# Patient Record
Sex: MALE | Race: OTHER
[De-identification: names, ages, dates, MRNs, and addresses within clinical notes are randomized per-mention and may not be internally consistent; named-entity substitution may affect disease eponyms.]

---

## 2022-05-15 ENCOUNTER — HOSPITAL ENCOUNTER (EMERGENCY)
Dept: HOSPITAL 54 - ER | Age: 42
Discharge: HOME | End: 2022-05-15
Payer: MEDICARE

## 2022-05-15 VITALS — BODY MASS INDEX: 36.1 KG/M2 | HEIGHT: 67 IN | WEIGHT: 230 LBS

## 2022-05-15 VITALS — DIASTOLIC BLOOD PRESSURE: 89 MMHG | SYSTOLIC BLOOD PRESSURE: 160 MMHG

## 2022-05-15 DIAGNOSIS — I10: ICD-10-CM

## 2022-05-15 DIAGNOSIS — Z20.822: ICD-10-CM

## 2022-05-15 DIAGNOSIS — Z79.899: ICD-10-CM

## 2022-05-15 DIAGNOSIS — F29: Primary | ICD-10-CM

## 2022-05-15 LAB
ALBUMIN SERPL BCP-MCNC: 2.9 G/DL (ref 3.4–5)
ALP SERPL-CCNC: 151 U/L (ref 46–116)
ALT SERPL W P-5'-P-CCNC: 22 U/L (ref 12–78)
APAP SERPL-MCNC: < 0 UG/ML (ref 10–30)
AST SERPL W P-5'-P-CCNC: 15 U/L (ref 15–37)
BASOPHILS # BLD AUTO: 0.1 K/UL (ref 0–0.2)
BASOPHILS NFR BLD AUTO: 0.8 % (ref 0–2)
BILIRUB DIRECT SERPL-MCNC: 0.1 MG/DL (ref 0–0.2)
BILIRUB SERPL-MCNC: 0.2 MG/DL (ref 0.2–1)
BILIRUB UR QL STRIP: NEGATIVE
BUN SERPL-MCNC: 21 MG/DL (ref 7–18)
CALCIUM SERPL-MCNC: 8.3 MG/DL (ref 8.5–10.1)
CHLORIDE SERPL-SCNC: 94 MMOL/L (ref 98–107)
CO2 SERPL-SCNC: 22 MMOL/L (ref 21–32)
COLOR UR: YELLOW
CREAT SERPL-MCNC: 1.6 MG/DL (ref 0.6–1.3)
EOSINOPHIL NFR BLD AUTO: 2.2 % (ref 0–6)
ETHANOL SERPL-MCNC: < 3 MG/DL (ref 0–0)
GLUCOSE SERPL-MCNC: 291 MG/DL (ref 74–106)
GLUCOSE UR STRIP-MCNC: (no result) MG/DL
HCT VFR BLD AUTO: 26 % (ref 39–51)
HGB BLD-MCNC: 8.7 G/DL (ref 13.5–17.5)
LEUKOCYTE ESTERASE UR QL STRIP: NEGATIVE
LYMPHOCYTES NFR BLD AUTO: 1.4 K/UL (ref 0.8–4.8)
LYMPHOCYTES NFR BLD AUTO: 19.9 % (ref 20–44)
MCHC RBC AUTO-ENTMCNC: 34 G/DL (ref 31–36)
MCV RBC AUTO: 85 FL (ref 80–96)
MONOCYTES NFR BLD AUTO: 0.6 K/UL (ref 0.1–1.3)
MONOCYTES NFR BLD AUTO: 8.6 % (ref 2–12)
NEUTROPHILS # BLD AUTO: 4.9 K/UL (ref 1.8–8.9)
NEUTROPHILS NFR BLD AUTO: 68.5 % (ref 43–81)
NITRITE UR QL STRIP: NEGATIVE
PH UR STRIP: 6 [PH] (ref 5–8)
PLATELET # BLD AUTO: 336 K/UL (ref 150–450)
POTASSIUM SERPL-SCNC: 4.4 MMOL/L (ref 3.5–5.1)
PROT SERPL-MCNC: 7 G/DL (ref 6.4–8.2)
PROT UR QL STRIP: 30 MG/DL
RBC # BLD AUTO: 3.02 MIL/UL (ref 4.5–6)
RBC #/AREA URNS HPF: (no result) /HPF (ref 0–2)
SODIUM SERPL-SCNC: 124 MMOL/L (ref 136–145)
UROBILINOGEN UR STRIP-MCNC: 0.2 EU/DL
WBC #/AREA URNS HPF: (no result) /HPF (ref 0–3)
WBC NRBC COR # BLD AUTO: 7.1 K/UL (ref 4.3–11)

## 2022-05-15 PROCEDURE — 99285 EMERGENCY DEPT VISIT HI MDM: CPT

## 2022-05-15 PROCEDURE — 80143 DRUG ASSAY ACETAMINOPHEN: CPT

## 2022-05-15 PROCEDURE — 96372 THER/PROPH/DIAG INJ SC/IM: CPT

## 2022-05-15 PROCEDURE — C9803 HOPD COVID-19 SPEC COLLECT: HCPCS

## 2022-05-15 PROCEDURE — 80048 BASIC METABOLIC PNL TOTAL CA: CPT

## 2022-05-15 PROCEDURE — 80307 DRUG TEST PRSMV CHEM ANLYZR: CPT

## 2022-05-15 PROCEDURE — 80320 DRUG SCREEN QUANTALCOHOLS: CPT

## 2022-05-15 PROCEDURE — 85025 COMPLETE CBC W/AUTO DIFF WBC: CPT

## 2022-05-15 PROCEDURE — 81001 URINALYSIS AUTO W/SCOPE: CPT

## 2022-05-15 PROCEDURE — 87426 SARSCOV CORONAVIRUS AG IA: CPT

## 2022-05-15 PROCEDURE — 80076 HEPATIC FUNCTION PANEL: CPT

## 2022-05-15 PROCEDURE — G0480 DRUG TEST DEF 1-7 CLASSES: HCPCS

## 2022-05-15 PROCEDURE — 36415 COLL VENOUS BLD VENIPUNCTURE: CPT

## 2023-01-24 ENCOUNTER — HOSPITAL ENCOUNTER (INPATIENT)
Dept: HOSPITAL 12 - REHABOV3 | Age: 43
LOS: 13 days | Discharge: HOME HEALTH | DRG: 871 | End: 2023-02-06
Attending: PHYSICAL MEDICINE & REHABILITATION | Admitting: PHYSICAL MEDICINE & REHABILITATION
Payer: MEDICARE

## 2023-01-24 VITALS — HEIGHT: 69 IN | BODY MASS INDEX: 35.99 KG/M2 | WEIGHT: 243 LBS

## 2023-01-24 VITALS — DIASTOLIC BLOOD PRESSURE: 92 MMHG | SYSTOLIC BLOOD PRESSURE: 157 MMHG

## 2023-01-24 DIAGNOSIS — K76.0: ICD-10-CM

## 2023-01-24 DIAGNOSIS — N49.3: ICD-10-CM

## 2023-01-24 DIAGNOSIS — E87.6: ICD-10-CM

## 2023-01-24 DIAGNOSIS — N40.1: ICD-10-CM

## 2023-01-24 DIAGNOSIS — N13.8: ICD-10-CM

## 2023-01-24 DIAGNOSIS — E43: ICD-10-CM

## 2023-01-24 DIAGNOSIS — A41.9: Primary | ICD-10-CM

## 2023-01-24 DIAGNOSIS — L02.214: ICD-10-CM

## 2023-01-24 DIAGNOSIS — F20.9: ICD-10-CM

## 2023-01-24 DIAGNOSIS — N17.0: ICD-10-CM

## 2023-01-24 DIAGNOSIS — I10: ICD-10-CM

## 2023-01-24 DIAGNOSIS — E11.10: ICD-10-CM

## 2023-01-24 DIAGNOSIS — N17.9: ICD-10-CM

## 2023-01-24 DIAGNOSIS — I46.9: ICD-10-CM

## 2023-01-24 DIAGNOSIS — E66.01: ICD-10-CM

## 2023-01-24 DIAGNOSIS — N13.6: ICD-10-CM

## 2023-01-24 DIAGNOSIS — Z91.14: ICD-10-CM

## 2023-01-24 DIAGNOSIS — B48.8: ICD-10-CM

## 2023-01-24 DIAGNOSIS — L02.215: ICD-10-CM

## 2023-01-24 DIAGNOSIS — D64.9: ICD-10-CM

## 2023-01-24 DIAGNOSIS — Z79.4: ICD-10-CM

## 2023-01-24 DIAGNOSIS — D68.59: ICD-10-CM

## 2023-01-24 DIAGNOSIS — N32.3: ICD-10-CM

## 2023-01-24 DIAGNOSIS — E11.51: ICD-10-CM

## 2023-01-24 DIAGNOSIS — R53.1: ICD-10-CM

## 2023-01-24 PROCEDURE — A6209 FOAM DRSG <=16 SQ IN W/O BDR: HCPCS

## 2023-01-24 PROCEDURE — A6213 FOAM DRG >16<=48 SQ IN W/BDR: HCPCS

## 2023-01-24 NOTE — NUR
Arrived in the floor from [facility] via [facility]. AAOx3, verbally responsive, calm, in no apparent 
distress. [place] 3 people assist. F/C intact and patent with 
moderate amount of yellow colored urine. Dressing on right ischial wound soiled and curled. 
Redness noted on perineum area. Able to move all extremities and able to repositioned self 
in bed. Routine admission care done. Safety measures and fall precaution initiated. Plan of 
care initiated. VS taken and recorded.

## 2023-01-25 VITALS — DIASTOLIC BLOOD PRESSURE: 97 MMHG | SYSTOLIC BLOOD PRESSURE: 160 MMHG

## 2023-01-25 VITALS — DIASTOLIC BLOOD PRESSURE: 69 MMHG | SYSTOLIC BLOOD PRESSURE: 126 MMHG

## 2023-01-25 VITALS — SYSTOLIC BLOOD PRESSURE: 143 MMHG | DIASTOLIC BLOOD PRESSURE: 63 MMHG

## 2023-01-25 VITALS — SYSTOLIC BLOOD PRESSURE: 142 MMHG | DIASTOLIC BLOOD PRESSURE: 82 MMHG

## 2023-01-25 RX ADMIN — Medication SCH MG: at 20:37

## 2023-01-25 RX ADMIN — CLOZAPINE SCH MG: 100 TABLET ORAL at 20:33

## 2023-01-25 RX ADMIN — PIPERACILLIN SODIUM AND TAZOBACTAM SODIUM SCH MLS/HR: .375; 3 INJECTION, POWDER, LYOPHILIZED, FOR SOLUTION INTRAVENOUS at 22:00

## 2023-01-25 RX ADMIN — PIPERACILLIN SODIUM AND TAZOBACTAM SODIUM SCH MLS/HR: .375; 3 INJECTION, POWDER, LYOPHILIZED, FOR SOLUTION INTRAVENOUS at 19:49

## 2023-01-25 RX ADMIN — INSULIN GLARGINE SCH UNITS: 100 INJECTION, SOLUTION SUBCUTANEOUS at 20:44

## 2023-01-25 RX ADMIN — CLOTRIMAZOLE AND BETAMETHASONE DIPROPIONATE SCH GM: 10; .5 CREAM TOPICAL at 20:33

## 2023-01-25 RX ADMIN — HYDROCODONE BITARTRATE AND ACETAMINOPHEN PRN TAB: 5; 325 TABLET ORAL at 19:59

## 2023-01-25 RX ADMIN — ANORECTAL OINTMENT SCH GM: 15.7; .44; 24; 20.6 OINTMENT TOPICAL at 20:34

## 2023-01-25 RX ADMIN — ANORECTAL OINTMENT SCH GM: 15.7; .44; 24; 20.6 OINTMENT TOPICAL at 09:17

## 2023-01-25 NOTE — NUR
RECEIVED REPORT FROM FLORIDA CASTRO RN. PATIENT IS ALERT & ORIENTED X4 AND SPEAKS ENGLISH. 
VITAL SIGNS STABLE. PATIENT TOLERATES PO MEDICATIONS AND DIET WELL. PATIENT PARTICIPATES 
WITH PHYSICAL AND OCCUPATIONAL THERAPY AS SCHEDULE. PATIENT VOIDS ADEQUATELY & HAD 3 BOWEL 
MOVEMENTS. PATIENT HAD COMPLAINTS OF PAIN, BUT DID NOT WANT PAIN MEDICATIONS AT THE TIME. 
FITZ PATEL, COMPLETED MEDICATION RECONCILIATION. NO ACUTE DISTRESS NOTED. FALL PRECAUTIONS 
OBSERVED; INCLUDING BUT NOT LIMITED TO BED IN LOWEST POSITION AND BED ALARM ON. ALL NEEDS 
MET AT THIS TIME. ENDORSED CARE TO VALENTÍN CASTRO, FOR CONTINUATION OF CARE.

## 2023-01-25 NOTE — NUR
BS checked 223 mg/dl. Denies any s/s of hyperglycemia, stating that he drunk cranberry juice 
an hour ago and refused any insulin to be taken at this time. Will monitor.

## 2023-01-26 VITALS — DIASTOLIC BLOOD PRESSURE: 84 MMHG | SYSTOLIC BLOOD PRESSURE: 145 MMHG

## 2023-01-26 VITALS — SYSTOLIC BLOOD PRESSURE: 122 MMHG | DIASTOLIC BLOOD PRESSURE: 66 MMHG

## 2023-01-26 VITALS — DIASTOLIC BLOOD PRESSURE: 83 MMHG | SYSTOLIC BLOOD PRESSURE: 158 MMHG

## 2023-01-26 VITALS — DIASTOLIC BLOOD PRESSURE: 65 MMHG | SYSTOLIC BLOOD PRESSURE: 113 MMHG

## 2023-01-26 RX ADMIN — PIPERACILLIN SODIUM AND TAZOBACTAM SODIUM SCH MLS/HR: .375; 3 INJECTION, POWDER, LYOPHILIZED, FOR SOLUTION INTRAVENOUS at 13:48

## 2023-01-26 RX ADMIN — CLOTRIMAZOLE AND BETAMETHASONE DIPROPIONATE SCH GM: 10; .5 CREAM TOPICAL at 17:45

## 2023-01-26 RX ADMIN — EMPAGLIFLOZIN SCH MG: 25 TABLET, FILM COATED ORAL at 09:42

## 2023-01-26 RX ADMIN — PANTOPRAZOLE SODIUM SCH MG: 40 TABLET, DELAYED RELEASE ORAL at 17:45

## 2023-01-26 RX ADMIN — FERROUS SULFATE TAB 325 MG (65 MG ELEMENTAL FE) SCH MG: 325 (65 FE) TAB at 09:43

## 2023-01-26 RX ADMIN — PIPERACILLIN SODIUM AND TAZOBACTAM SODIUM SCH MLS/HR: .375; 3 INJECTION, POWDER, LYOPHILIZED, FOR SOLUTION INTRAVENOUS at 05:19

## 2023-01-26 RX ADMIN — SODIUM HYPOCHLORITE SCH ML: 1.25 SOLUTION TOPICAL at 09:45

## 2023-01-26 RX ADMIN — CLOZAPINE SCH MG: 100 TABLET ORAL at 09:42

## 2023-01-26 RX ADMIN — SODIUM CHLORIDE PRN UNIT: 9 INJECTION, SOLUTION INTRAVENOUS at 17:33

## 2023-01-26 RX ADMIN — SODIUM CHLORIDE PRN UNIT: 9 INJECTION, SOLUTION INTRAVENOUS at 12:36

## 2023-01-26 RX ADMIN — Medication SCH EACH: at 20:21

## 2023-01-26 RX ADMIN — INSULIN GLARGINE SCH UNITS: 100 INJECTION, SOLUTION SUBCUTANEOUS at 20:23

## 2023-01-26 RX ADMIN — HYDROCHLOROTHIAZIDE SCH MG: 25 TABLET ORAL at 09:44

## 2023-01-26 RX ADMIN — PANTOPRAZOLE SODIUM SCH MG: 40 TABLET, DELAYED RELEASE ORAL at 06:03

## 2023-01-26 RX ADMIN — VALSARTAN SCH MG: 160 TABLET ORAL at 09:43

## 2023-01-26 RX ADMIN — ANORECTAL OINTMENT SCH GM: 15.7; .44; 24; 20.6 OINTMENT TOPICAL at 20:18

## 2023-01-26 RX ADMIN — CLOTRIMAZOLE AND BETAMETHASONE DIPROPIONATE SCH GM: 10; .5 CREAM TOPICAL at 09:43

## 2023-01-26 RX ADMIN — INSULIN GLARGINE SCH UNITS: 100 INJECTION, SOLUTION SUBCUTANEOUS at 09:54

## 2023-01-26 RX ADMIN — Medication SCH ML: at 09:45

## 2023-01-26 RX ADMIN — SODIUM CHLORIDE PRN UNIT: 9 INJECTION, SOLUTION INTRAVENOUS at 20:25

## 2023-01-26 RX ADMIN — FOLIC ACID SCH MG: 1 TABLET ORAL at 09:44

## 2023-01-26 RX ADMIN — CLOZAPINE SCH MG: 100 TABLET ORAL at 20:17

## 2023-01-26 RX ADMIN — ANORECTAL OINTMENT SCH GM: 15.7; .44; 24; 20.6 OINTMENT TOPICAL at 09:45

## 2023-01-26 RX ADMIN — Medication SCH ML: at 17:45

## 2023-01-26 RX ADMIN — Medication SCH EACH: at 16:33

## 2023-01-26 RX ADMIN — Medication SCH MG: at 17:46

## 2023-01-26 RX ADMIN — PIPERACILLIN SODIUM AND TAZOBACTAM SODIUM SCH MLS/HR: .375; 3 INJECTION, POWDER, LYOPHILIZED, FOR SOLUTION INTRAVENOUS at 21:22

## 2023-01-26 RX ADMIN — Medication SCH MG: at 09:00

## 2023-01-26 NOTE — NUR
WOUND CARE: PT SEEN FOR  PERINEAL/RT BUTTOCK SURGICAL WOUND WHICH IS NOTED TO HAVE SURGICAL 
DEVICE IN PLACE. RED GRANULATION TISSUE NOTED WITH MODERATE AMOUNT OF SEROSANGUINOUS 
DRAINAGE, NO ODOR. RECOMMEND SURGICAL FOLLOW UP. DISCUSSED SKIN PROTECTION AND WOUND CARE 
RECOMMENDATIONS WITH NURSING STAFF. MD IN AGREEMENT WITH PLAN OF CARE.

## 2023-01-26 NOTE — NUR
RECEIVED REPORT FROM FLORIDA CASTRO RN. PATIENT IS ALERT & ORIENTED X4 AND SPEAKS ENGLISH. 
VITAL SIGNS STABLE. PATIENT TOLERATES PO & IV MEDICATIONS AND DIET WELL. ANTIBIOTICS GIVEN 
AS ORDERED. PATIENT PARTICIPATES WITH PHYSICAL AND OCCUPATIONAL THERAPY AS SCHEDULE. JAMES 
CATHETER PATENT AND DRAINING. PATIENT VOIDS ADEQUATELY & HAD 3 BOWEL MOVEMENTS. PATIENT HAD 
COMPLAINTS OF PAIN, BUT DID NOT WANT PAIN MEDICATIONS AT THE TIME. INSULIN SLIDING SCALE AND 
DIABETES MANAGEMENT RECONCILED BY PHARMACY, PER REQUEST OF FITZ PATEL. NO ACUTE DISTRESS 
NOTED. FALL PRECAUTIONS OBSERVED; INCLUDING BUT NOT LIMITED TO BED IN LOWEST POSITION AND 
BED ALARM ON. ALL NEEDS MET AT THIS TIME. ENDORSED CARE TO VALENTÍN CASTRO, FOR CONTINUATION OF 
CARE.

## 2023-01-26 NOTE — NUR
WOUND CARE CONSULT: ATTEMPTED TO SEE PT THIS AM AT 0750 BUT PT WAS SLEEPING SOUNDLY. 
REVIEWED CHART, NURSING DOCUMENTATION AND PHOTO WHICH INDICATES PERINEAL/BUTTOCK SURGICAL 
WOUND, PRESENT ON ADMISSION. MSG LEFT FOR DR ALLAN BRIONES REGARDING WOUND TREATMENT AND 
SURGICAL FOLLOW UP. DISCUSSED SKIN PROTECTION WITH NURSING STAFF.

## 2023-01-27 VITALS — SYSTOLIC BLOOD PRESSURE: 140 MMHG | DIASTOLIC BLOOD PRESSURE: 75 MMHG

## 2023-01-27 VITALS — DIASTOLIC BLOOD PRESSURE: 72 MMHG | SYSTOLIC BLOOD PRESSURE: 125 MMHG

## 2023-01-27 VITALS — DIASTOLIC BLOOD PRESSURE: 73 MMHG | SYSTOLIC BLOOD PRESSURE: 130 MMHG

## 2023-01-27 VITALS — SYSTOLIC BLOOD PRESSURE: 153 MMHG | DIASTOLIC BLOOD PRESSURE: 85 MMHG

## 2023-01-27 RX ADMIN — HYDROCODONE BITARTRATE AND ACETAMINOPHEN PRN TAB: 5; 325 TABLET ORAL at 11:51

## 2023-01-27 RX ADMIN — PANTOPRAZOLE SODIUM SCH MG: 40 TABLET, DELAYED RELEASE ORAL at 05:24

## 2023-01-27 RX ADMIN — PIPERACILLIN SODIUM AND TAZOBACTAM SODIUM SCH MLS/HR: .375; 3 INJECTION, POWDER, LYOPHILIZED, FOR SOLUTION INTRAVENOUS at 05:19

## 2023-01-27 RX ADMIN — SODIUM CHLORIDE PRN UNIT: 9 INJECTION, SOLUTION INTRAVENOUS at 17:31

## 2023-01-27 RX ADMIN — INSULIN GLARGINE SCH UNITS: 100 INJECTION, SOLUTION SUBCUTANEOUS at 09:19

## 2023-01-27 RX ADMIN — LORAZEPAM PRN MG: 1 TABLET ORAL at 17:31

## 2023-01-27 RX ADMIN — Medication SCH ML: at 09:18

## 2023-01-27 RX ADMIN — FOLIC ACID SCH MG: 1 TABLET ORAL at 09:14

## 2023-01-27 RX ADMIN — VALSARTAN SCH MG: 160 TABLET ORAL at 09:31

## 2023-01-27 RX ADMIN — FERROUS SULFATE TAB 325 MG (65 MG ELEMENTAL FE) SCH MG: 325 (65 FE) TAB at 09:15

## 2023-01-27 RX ADMIN — HYDROCODONE BITARTRATE AND ACETAMINOPHEN PRN TAB: 5; 325 TABLET ORAL at 17:33

## 2023-01-27 RX ADMIN — SODIUM HYPOCHLORITE SCH ML: 1.25 SOLUTION TOPICAL at 09:20

## 2023-01-27 RX ADMIN — PANTOPRAZOLE SODIUM SCH MG: 40 TABLET, DELAYED RELEASE ORAL at 17:22

## 2023-01-27 RX ADMIN — EMPAGLIFLOZIN SCH MG: 25 TABLET, FILM COATED ORAL at 09:16

## 2023-01-27 RX ADMIN — CLOZAPINE SCH MG: 100 TABLET ORAL at 20:44

## 2023-01-27 RX ADMIN — Medication SCH MG: at 17:22

## 2023-01-27 RX ADMIN — Medication SCH ML: at 17:22

## 2023-01-27 RX ADMIN — ANORECTAL OINTMENT SCH GM: 15.7; .44; 24; 20.6 OINTMENT TOPICAL at 20:47

## 2023-01-27 RX ADMIN — PIPERACILLIN SODIUM AND TAZOBACTAM SODIUM SCH MLS/HR: .375; 3 INJECTION, POWDER, LYOPHILIZED, FOR SOLUTION INTRAVENOUS at 14:00

## 2023-01-27 RX ADMIN — Medication SCH EACH: at 20:44

## 2023-01-27 RX ADMIN — INSULIN GLARGINE SCH UNITS: 100 INJECTION, SOLUTION SUBCUTANEOUS at 20:45

## 2023-01-27 RX ADMIN — PIPERACILLIN SODIUM AND TAZOBACTAM SODIUM SCH MLS/HR: .375; 3 INJECTION, POWDER, LYOPHILIZED, FOR SOLUTION INTRAVENOUS at 22:12

## 2023-01-27 RX ADMIN — Medication SCH EACH: at 11:48

## 2023-01-27 RX ADMIN — SODIUM CHLORIDE PRN UNIT: 9 INJECTION, SOLUTION INTRAVENOUS at 09:22

## 2023-01-27 RX ADMIN — Medication SCH MG: at 09:15

## 2023-01-27 RX ADMIN — CLOTRIMAZOLE AND BETAMETHASONE DIPROPIONATE SCH GM: 10; .5 CREAM TOPICAL at 17:23

## 2023-01-27 RX ADMIN — ANORECTAL OINTMENT SCH GM: 15.7; .44; 24; 20.6 OINTMENT TOPICAL at 09:17

## 2023-01-27 RX ADMIN — Medication SCH EACH: at 06:06

## 2023-01-27 RX ADMIN — SODIUM CHLORIDE PRN UNIT: 9 INJECTION, SOLUTION INTRAVENOUS at 20:56

## 2023-01-27 RX ADMIN — CLOZAPINE SCH MG: 100 TABLET ORAL at 09:16

## 2023-01-27 RX ADMIN — SODIUM CHLORIDE PRN UNIT: 9 INJECTION, SOLUTION INTRAVENOUS at 11:49

## 2023-01-27 RX ADMIN — CLOTRIMAZOLE AND BETAMETHASONE DIPROPIONATE SCH GM: 10; .5 CREAM TOPICAL at 09:21

## 2023-01-27 RX ADMIN — HYDROCHLOROTHIAZIDE SCH MG: 25 TABLET ORAL at 09:15

## 2023-01-27 RX ADMIN — Medication SCH EACH: at 17:20

## 2023-01-27 NOTE — NUR
1900-PATIENT IN BED, AWAKE, A/Ox4, VERBALIZES NEEDS AND FOLLOWS DIRECTIONS. NO RESPIRATORY 
DISTRESS NOTED. The patient has patent jason midline that is clean, patent, dry, and intact. 
The patient has a schuler catheter that is clean, patent, dry, intact, and below the bladder. 
The patient has a wound in his groin area. Wound is not draining, and is clean, and dry. 
Changed dressing of wound. SAFETY MEASURES AND CALL LIGHT AT REACH, bed at lowest position, 
wheels lock, and two side rails up, bed alarm on. ORAL FLUIDS OFFERED AS IVELISSE. AND TAKEN 
WELL. Will continue to monitor throughout the shift. 



2200- The patient is awake and watching television. The patient has no signs of distress, or 
sob. Will continue to monitor throughout the shift. 



0300- The patient request for an extra pillow. Item is given to the patient. The patient has 
no sob. Will continue to monitor throughout the shift.

## 2023-01-28 VITALS — SYSTOLIC BLOOD PRESSURE: 135 MMHG | DIASTOLIC BLOOD PRESSURE: 83 MMHG

## 2023-01-28 VITALS — SYSTOLIC BLOOD PRESSURE: 141 MMHG | DIASTOLIC BLOOD PRESSURE: 88 MMHG

## 2023-01-28 VITALS — DIASTOLIC BLOOD PRESSURE: 78 MMHG | SYSTOLIC BLOOD PRESSURE: 141 MMHG

## 2023-01-28 RX ADMIN — PIPERACILLIN SODIUM AND TAZOBACTAM SODIUM SCH MLS/HR: .375; 3 INJECTION, POWDER, LYOPHILIZED, FOR SOLUTION INTRAVENOUS at 13:10

## 2023-01-28 RX ADMIN — PIPERACILLIN SODIUM AND TAZOBACTAM SODIUM SCH MLS/HR: .375; 3 INJECTION, POWDER, LYOPHILIZED, FOR SOLUTION INTRAVENOUS at 22:10

## 2023-01-28 RX ADMIN — CLOTRIMAZOLE AND BETAMETHASONE DIPROPIONATE SCH GM: 10; .5 CREAM TOPICAL at 09:11

## 2023-01-28 RX ADMIN — FERROUS SULFATE TAB 325 MG (65 MG ELEMENTAL FE) SCH MG: 325 (65 FE) TAB at 09:09

## 2023-01-28 RX ADMIN — CLOZAPINE SCH MG: 100 TABLET ORAL at 09:15

## 2023-01-28 RX ADMIN — Medication SCH ML: at 09:10

## 2023-01-28 RX ADMIN — ANORECTAL OINTMENT SCH GM: 15.7; .44; 24; 20.6 OINTMENT TOPICAL at 09:10

## 2023-01-28 RX ADMIN — PANTOPRAZOLE SODIUM SCH MG: 40 TABLET, DELAYED RELEASE ORAL at 06:49

## 2023-01-28 RX ADMIN — CLOTRIMAZOLE AND BETAMETHASONE DIPROPIONATE SCH GM: 10; .5 CREAM TOPICAL at 16:20

## 2023-01-28 RX ADMIN — Medication SCH MG: at 16:19

## 2023-01-28 RX ADMIN — EMPAGLIFLOZIN SCH MG: 25 TABLET, FILM COATED ORAL at 09:15

## 2023-01-28 RX ADMIN — VALSARTAN SCH MG: 160 TABLET ORAL at 09:10

## 2023-01-28 RX ADMIN — CLOZAPINE SCH MG: 100 TABLET ORAL at 21:21

## 2023-01-28 RX ADMIN — Medication SCH EACH: at 16:19

## 2023-01-28 RX ADMIN — SODIUM CHLORIDE PRN UNIT: 9 INJECTION, SOLUTION INTRAVENOUS at 16:31

## 2023-01-28 RX ADMIN — SODIUM HYPOCHLORITE SCH ML: 1.25 SOLUTION TOPICAL at 09:11

## 2023-01-28 RX ADMIN — Medication SCH EACH: at 07:37

## 2023-01-28 RX ADMIN — Medication SCH EACH: at 21:22

## 2023-01-28 RX ADMIN — Medication SCH EACH: at 11:04

## 2023-01-28 RX ADMIN — PANTOPRAZOLE SODIUM SCH MG: 40 TABLET, DELAYED RELEASE ORAL at 16:18

## 2023-01-28 RX ADMIN — INSULIN GLARGINE SCH UNITS: 100 INJECTION, SOLUTION SUBCUTANEOUS at 09:15

## 2023-01-28 RX ADMIN — FOLIC ACID SCH MG: 1 TABLET ORAL at 09:09

## 2023-01-28 RX ADMIN — Medication SCH MG: at 09:09

## 2023-01-28 RX ADMIN — HYDROCHLOROTHIAZIDE SCH MG: 25 TABLET ORAL at 09:09

## 2023-01-28 RX ADMIN — PIPERACILLIN SODIUM AND TAZOBACTAM SODIUM SCH MLS/HR: .375; 3 INJECTION, POWDER, LYOPHILIZED, FOR SOLUTION INTRAVENOUS at 06:49

## 2023-01-28 RX ADMIN — SODIUM CHLORIDE PRN UNIT: 9 INJECTION, SOLUTION INTRAVENOUS at 11:09

## 2023-01-28 RX ADMIN — INSULIN GLARGINE SCH UNITS: 100 INJECTION, SOLUTION SUBCUTANEOUS at 21:22

## 2023-01-28 RX ADMIN — SODIUM CHLORIDE PRN UNIT: 9 INJECTION, SOLUTION INTRAVENOUS at 21:23

## 2023-01-28 RX ADMIN — Medication SCH ML: at 16:19

## 2023-01-28 RX ADMIN — ANORECTAL OINTMENT SCH GM: 15.7; .44; 24; 20.6 OINTMENT TOPICAL at 21:26

## 2023-01-28 NOTE — NUR
1910-PATIENT IN BED, AWAKE, A/Ox4, VERBALIZES NEEDS AND FOLLOWS DIRECTIONS. NO RESPIRATORY 
DISTRESS NOTED. The patient has patent jason midline that is clean, patent, dry, and intact. 
The patient has a schuler catheter that is clean, patent, dry, intact, and below the bladder. 
The patient has a wound in his groin area. Wound is not draining, and is clean, and dry. 
Changed dressing of wound. SAFETY MEASURES AND CALL LIGHT AT REACH, bed at lowest position, 
wheels lock, and two side rails up, bed alarm on. ORAL FLUIDS OFFERED AS IVELISSE. AND TAKEN 
WELL. Will continue to monitor throughout the shift. 



2300- The patient is awake and request for extra water.  item is given to the patient. The 
patient has no signs of distress, or sob. Will continue to monitor throughout the shift. 



0200- The patient request for an extra pillow. Item is given to the patient. The patient has 
no sob. Will continue to monitor throughout the shift.

## 2023-01-28 NOTE — NUR
dressing changed to right groin wound, no drainage noted, wound bed is red in color, 
drainage tube is intact. no acute distress noted, no events noted.

## 2023-01-29 VITALS — DIASTOLIC BLOOD PRESSURE: 70 MMHG | SYSTOLIC BLOOD PRESSURE: 118 MMHG

## 2023-01-29 VITALS — SYSTOLIC BLOOD PRESSURE: 132 MMHG | DIASTOLIC BLOOD PRESSURE: 89 MMHG

## 2023-01-29 VITALS — DIASTOLIC BLOOD PRESSURE: 66 MMHG | SYSTOLIC BLOOD PRESSURE: 117 MMHG

## 2023-01-29 LAB
ALP SERPL-CCNC: 181 U/L (ref 50–136)
ALT SERPL W/O P-5'-P-CCNC: 20 U/L (ref 16–63)
APPEARANCE UR: (no result)
AST SERPL-CCNC: 12 U/L (ref 15–37)
BILIRUB SERPL-MCNC: 0.2 MG/DL (ref 0.2–1)
BILIRUB UR QL STRIP: NEGATIVE
BUN SERPL-MCNC: 14 MG/DL (ref 7–18)
CHLORIDE SERPL-SCNC: 103 MMOL/L (ref 98–107)
CO2 SERPL-SCNC: 30 MMOL/L (ref 21–32)
COLOR UR: YELLOW
CREAT SERPL-MCNC: 1.9 MG/DL (ref 0.6–1.3)
CREAT UR-MCNC: 42.4 MG/DL (ref 30–125)
DEPRECATED SQUAMOUS URNS QL MICRO: (no result) /HPF
GLUCOSE SERPL-MCNC: 141 MG/DL (ref 74–106)
GLUCOSE UR STRIP-MCNC: (no result) MG/DL
HCT VFR BLD AUTO: 26.1 % (ref 36.7–47.1)
HGB UR QL STRIP: (no result)
KETONES UR STRIP-MCNC: NEGATIVE MG/DL
LEUKOCYTE ESTERASE UR QL STRIP: (no result)
MAGNESIUM SERPL-MCNC: 2.3 MG/DL (ref 1.8–2.4)
MCH RBC QN AUTO: 29.7 UUG (ref 23.8–33.4)
MCV RBC AUTO: 89.8 FL (ref 73–96.2)
NITRITE UR QL STRIP: NEGATIVE
PH UR STRIP: 6 [PH] (ref 5–8)
PHOSPHATE SERPL-MCNC: 4.5 MG/DL (ref 2.5–4.9)
PLATELET # BLD AUTO: 377 K/UL (ref 152–348)
POTASSIUM SERPL-SCNC: 3.3 MMOL/L (ref 3.5–5.1)
PROT UR-MCNC: 70.7 MG/DL
RBC #/AREA URNS HPF: (no result) /HPF (ref 0–3)
SP GR UR STRIP: 1.01 (ref 1–1.03)
UROBILINOGEN UR STRIP-MCNC: 0.2 E.U./DL
WBC #/AREA URNS HPF: (no result) /HPF
WBC #/AREA URNS HPF: (no result) /HPF (ref 0–3)
WS STN SPEC: 7.3 G/DL (ref 6.4–8.2)
YEAST URNS QL MICRO: (no result) /HPF

## 2023-01-29 RX ADMIN — ANORECTAL OINTMENT SCH GM: 15.7; .44; 24; 20.6 OINTMENT TOPICAL at 08:07

## 2023-01-29 RX ADMIN — SODIUM CHLORIDE PRN UNIT: 9 INJECTION, SOLUTION INTRAVENOUS at 11:36

## 2023-01-29 RX ADMIN — FERROUS SULFATE TAB 325 MG (65 MG ELEMENTAL FE) SCH MG: 325 (65 FE) TAB at 08:08

## 2023-01-29 RX ADMIN — INSULIN GLARGINE SCH UNITS: 100 INJECTION, SOLUTION SUBCUTANEOUS at 20:36

## 2023-01-29 RX ADMIN — Medication SCH EACH: at 20:30

## 2023-01-29 RX ADMIN — Medication SCH ML: at 08:07

## 2023-01-29 RX ADMIN — Medication SCH MG: at 08:05

## 2023-01-29 RX ADMIN — FOLIC ACID SCH MG: 1 TABLET ORAL at 08:02

## 2023-01-29 RX ADMIN — CLOZAPINE SCH MG: 100 TABLET ORAL at 08:06

## 2023-01-29 RX ADMIN — Medication SCH MG: at 16:40

## 2023-01-29 RX ADMIN — SODIUM HYPOCHLORITE SCH ML: 1.25 SOLUTION TOPICAL at 08:08

## 2023-01-29 RX ADMIN — Medication SCH EACH: at 16:39

## 2023-01-29 RX ADMIN — SODIUM CHLORIDE PRN UNIT: 9 INJECTION, SOLUTION INTRAVENOUS at 07:56

## 2023-01-29 RX ADMIN — EMPAGLIFLOZIN SCH MG: 25 TABLET, FILM COATED ORAL at 08:06

## 2023-01-29 RX ADMIN — PIPERACILLIN SODIUM AND TAZOBACTAM SODIUM SCH MLS/HR: .375; 3 INJECTION, POWDER, LYOPHILIZED, FOR SOLUTION INTRAVENOUS at 14:56

## 2023-01-29 RX ADMIN — ANORECTAL OINTMENT SCH GM: 15.7; .44; 24; 20.6 OINTMENT TOPICAL at 20:26

## 2023-01-29 RX ADMIN — Medication SCH ML: at 16:40

## 2023-01-29 RX ADMIN — VALSARTAN SCH MG: 160 TABLET ORAL at 08:05

## 2023-01-29 RX ADMIN — SODIUM CHLORIDE PRN UNIT: 9 INJECTION, SOLUTION INTRAVENOUS at 16:39

## 2023-01-29 RX ADMIN — CLOTRIMAZOLE AND BETAMETHASONE DIPROPIONATE SCH GM: 10; .5 CREAM TOPICAL at 16:41

## 2023-01-29 RX ADMIN — Medication SCH EACH: at 11:33

## 2023-01-29 RX ADMIN — PANTOPRAZOLE SODIUM SCH MG: 40 TABLET, DELAYED RELEASE ORAL at 06:27

## 2023-01-29 RX ADMIN — PIPERACILLIN SODIUM AND TAZOBACTAM SODIUM SCH MLS/HR: .375; 3 INJECTION, POWDER, LYOPHILIZED, FOR SOLUTION INTRAVENOUS at 06:27

## 2023-01-29 RX ADMIN — INSULIN GLARGINE SCH UNITS: 100 INJECTION, SOLUTION SUBCUTANEOUS at 08:01

## 2023-01-29 RX ADMIN — CLOZAPINE SCH MG: 100 TABLET ORAL at 20:24

## 2023-01-29 RX ADMIN — Medication SCH EACH: at 07:57

## 2023-01-29 RX ADMIN — CLOTRIMAZOLE AND BETAMETHASONE DIPROPIONATE SCH GM: 10; .5 CREAM TOPICAL at 08:08

## 2023-01-29 RX ADMIN — HYDROCHLOROTHIAZIDE SCH MG: 25 TABLET ORAL at 08:04

## 2023-01-29 RX ADMIN — PANTOPRAZOLE SODIUM SCH MG: 40 TABLET, DELAYED RELEASE ORAL at 16:40

## 2023-01-29 RX ADMIN — SODIUM CHLORIDE PRN UNIT: 9 INJECTION, SOLUTION INTRAVENOUS at 20:35

## 2023-01-29 RX ADMIN — PIPERACILLIN SODIUM AND TAZOBACTAM SODIUM SCH MLS/HR: .375; 3 INJECTION, POWDER, LYOPHILIZED, FOR SOLUTION INTRAVENOUS at 21:29

## 2023-01-29 NOTE — NUR
patient is alert, oriented x4, patient noted with blood pressure dropped during physical 
therapy session, however patient remained alert, oriented x4, no sob, respiration are even 
nonlabored, skin warm and dry to touch, blood pressure rechecked 97/55. assisted back to 
bed, fluids given, tolerated well. continue to monitor 

-------------------------------------------------------------------------------

Addendum: 01/29/23 at 1600 by SOLO MAY RN, RN

-------------------------------------------------------------------------------

Patient complained feeling dizzy though. reported to FITZ carpenter, FITZ carpenter ordered 
parameteres to all blood pressure medications. continue to monitor patient closely, no acute 
distress noted at this time.

-------------------------------------------------------------------------------

Addendum: 01/29/23 at 1841 by SOLO MAY RN RN

-------------------------------------------------------------------------------

patient refused to have his right groin area dressing changed, risks and benefits explained, 
patient verbalized understanding of it. patient stated he feels dry. endorse to next shift 
accordingly.

## 2023-01-30 VITALS — SYSTOLIC BLOOD PRESSURE: 121 MMHG | DIASTOLIC BLOOD PRESSURE: 85 MMHG

## 2023-01-30 VITALS — SYSTOLIC BLOOD PRESSURE: 121 MMHG | DIASTOLIC BLOOD PRESSURE: 76 MMHG

## 2023-01-30 VITALS — DIASTOLIC BLOOD PRESSURE: 70 MMHG | SYSTOLIC BLOOD PRESSURE: 127 MMHG

## 2023-01-30 VITALS — DIASTOLIC BLOOD PRESSURE: 54 MMHG | SYSTOLIC BLOOD PRESSURE: 93 MMHG

## 2023-01-30 LAB
ALP SERPL-CCNC: 185 U/L (ref 50–136)
ALT SERPL W/O P-5'-P-CCNC: 19 U/L (ref 16–63)
AST SERPL-CCNC: 20 U/L (ref 15–37)
BILIRUB SERPL-MCNC: 0.2 MG/DL (ref 0.2–1)
BUN SERPL-MCNC: 16 MG/DL (ref 7–18)
CHLORIDE SERPL-SCNC: 104 MMOL/L (ref 98–107)
CO2 SERPL-SCNC: 29 MMOL/L (ref 21–32)
CREAT SERPL-MCNC: 2.1 MG/DL (ref 0.6–1.3)
GLUCOSE SERPL-MCNC: 142 MG/DL (ref 74–106)
HCT VFR BLD AUTO: 27.8 % (ref 36.7–47.1)
MAGNESIUM SERPL-MCNC: 2.4 MG/DL (ref 1.8–2.4)
MCH RBC QN AUTO: 29.9 UUG (ref 23.8–33.4)
MCV RBC AUTO: 89.6 FL (ref 73–96.2)
PHOSPHATE SERPL-MCNC: 4.3 MG/DL (ref 2.5–4.9)
PLATELET # BLD AUTO: 379 K/UL (ref 152–348)
POTASSIUM SERPL-SCNC: 3.8 MMOL/L (ref 3.5–5.1)
WS STN SPEC: 7.7 G/DL (ref 6.4–8.2)

## 2023-01-30 RX ADMIN — VALSARTAN SCH MG: 160 TABLET ORAL at 08:48

## 2023-01-30 RX ADMIN — ANORECTAL OINTMENT SCH GM: 15.7; .44; 24; 20.6 OINTMENT TOPICAL at 08:50

## 2023-01-30 RX ADMIN — CLOZAPINE SCH MG: 100 TABLET ORAL at 08:55

## 2023-01-30 RX ADMIN — PANTOPRAZOLE SODIUM SCH MG: 40 TABLET, DELAYED RELEASE ORAL at 16:48

## 2023-01-30 RX ADMIN — PIPERACILLIN SODIUM AND TAZOBACTAM SODIUM SCH MLS/HR: .375; 3 INJECTION, POWDER, LYOPHILIZED, FOR SOLUTION INTRAVENOUS at 22:04

## 2023-01-30 RX ADMIN — HYDROCODONE BITARTRATE AND ACETAMINOPHEN PRN TAB: 5; 325 TABLET ORAL at 12:21

## 2023-01-30 RX ADMIN — Medication SCH EACH: at 06:22

## 2023-01-30 RX ADMIN — Medication SCH MG: at 08:49

## 2023-01-30 RX ADMIN — HYDROCHLOROTHIAZIDE SCH MG: 25 TABLET ORAL at 08:49

## 2023-01-30 RX ADMIN — Medication SCH EACH: at 20:15

## 2023-01-30 RX ADMIN — Medication SCH EACH: at 16:53

## 2023-01-30 RX ADMIN — Medication SCH EACH: at 12:10

## 2023-01-30 RX ADMIN — INSULIN GLARGINE SCH UNITS: 100 INJECTION, SOLUTION SUBCUTANEOUS at 09:13

## 2023-01-30 RX ADMIN — EMPAGLIFLOZIN SCH MG: 25 TABLET, FILM COATED ORAL at 09:01

## 2023-01-30 RX ADMIN — PANTOPRAZOLE SODIUM SCH MG: 40 TABLET, DELAYED RELEASE ORAL at 06:19

## 2023-01-30 RX ADMIN — CLOTRIMAZOLE AND BETAMETHASONE DIPROPIONATE SCH GM: 10; .5 CREAM TOPICAL at 16:57

## 2023-01-30 RX ADMIN — Medication SCH ML: at 16:57

## 2023-01-30 RX ADMIN — ANORECTAL OINTMENT SCH GM: 15.7; .44; 24; 20.6 OINTMENT TOPICAL at 20:11

## 2023-01-30 RX ADMIN — PIPERACILLIN SODIUM AND TAZOBACTAM SODIUM SCH MLS/HR: .375; 3 INJECTION, POWDER, LYOPHILIZED, FOR SOLUTION INTRAVENOUS at 14:34

## 2023-01-30 RX ADMIN — FOLIC ACID SCH MG: 1 TABLET ORAL at 08:48

## 2023-01-30 RX ADMIN — CLOTRIMAZOLE AND BETAMETHASONE DIPROPIONATE SCH GM: 10; .5 CREAM TOPICAL at 08:51

## 2023-01-30 RX ADMIN — Medication SCH MG: at 16:49

## 2023-01-30 RX ADMIN — INSULIN GLARGINE SCH UNITS: 100 INJECTION, SOLUTION SUBCUTANEOUS at 20:16

## 2023-01-30 RX ADMIN — SODIUM CHLORIDE PRN UNIT: 9 INJECTION, SOLUTION INTRAVENOUS at 16:56

## 2023-01-30 RX ADMIN — SODIUM HYPOCHLORITE SCH ML: 1.25 SOLUTION TOPICAL at 10:58

## 2023-01-30 RX ADMIN — FERROUS SULFATE TAB 325 MG (65 MG ELEMENTAL FE) SCH MG: 325 (65 FE) TAB at 08:52

## 2023-01-30 RX ADMIN — Medication SCH ML: at 08:50

## 2023-01-30 RX ADMIN — PIPERACILLIN SODIUM AND TAZOBACTAM SODIUM SCH MLS/HR: .375; 3 INJECTION, POWDER, LYOPHILIZED, FOR SOLUTION INTRAVENOUS at 05:18

## 2023-01-30 RX ADMIN — SODIUM CHLORIDE PRN UNIT: 9 INJECTION, SOLUTION INTRAVENOUS at 20:18

## 2023-01-30 RX ADMIN — SODIUM CHLORIDE PRN UNIT: 9 INJECTION, SOLUTION INTRAVENOUS at 12:12

## 2023-01-30 RX ADMIN — CLOZAPINE SCH MG: 100 TABLET ORAL at 20:11

## 2023-01-30 NOTE — NUR
pain complained of pain, he asked for NOrco and it was administered, tolerated well. He is 
alert and orient x4. Breathing normal on room air. He is calm and relaxed at this time. Will 
continue to monitor.

## 2023-01-31 VITALS — SYSTOLIC BLOOD PRESSURE: 127 MMHG | DIASTOLIC BLOOD PRESSURE: 83 MMHG

## 2023-01-31 VITALS — SYSTOLIC BLOOD PRESSURE: 129 MMHG | DIASTOLIC BLOOD PRESSURE: 68 MMHG

## 2023-01-31 VITALS — SYSTOLIC BLOOD PRESSURE: 96 MMHG | DIASTOLIC BLOOD PRESSURE: 53 MMHG

## 2023-01-31 VITALS — DIASTOLIC BLOOD PRESSURE: 65 MMHG | SYSTOLIC BLOOD PRESSURE: 122 MMHG

## 2023-01-31 LAB
BUN SERPL-MCNC: 18 MG/DL (ref 7–18)
CHLORIDE SERPL-SCNC: 100 MMOL/L (ref 98–107)
CO2 SERPL-SCNC: 28 MMOL/L (ref 21–32)
CREAT SERPL-MCNC: 2.1 MG/DL (ref 0.6–1.3)
GLUCOSE SERPL-MCNC: 228 MG/DL (ref 74–106)
HCT VFR BLD AUTO: 28.6 % (ref 36.7–47.1)
MAGNESIUM SERPL-MCNC: 2.4 MG/DL (ref 1.8–2.4)
MCH RBC QN AUTO: 29.3 UUG (ref 23.8–33.4)
MCV RBC AUTO: 89.5 FL (ref 73–96.2)
PHOSPHATE SERPL-MCNC: 3.7 MG/DL (ref 2.5–4.9)
PLATELET # BLD AUTO: 391 K/UL (ref 152–348)
POTASSIUM SERPL-SCNC: 3.9 MMOL/L (ref 3.5–5.1)

## 2023-01-31 RX ADMIN — VALSARTAN SCH MG: 160 TABLET ORAL at 09:48

## 2023-01-31 RX ADMIN — Medication SCH EACH: at 20:53

## 2023-01-31 RX ADMIN — PANTOPRAZOLE SODIUM SCH MG: 40 TABLET, DELAYED RELEASE ORAL at 16:48

## 2023-01-31 RX ADMIN — SODIUM CHLORIDE PRN UNIT: 9 INJECTION, SOLUTION INTRAVENOUS at 20:59

## 2023-01-31 RX ADMIN — CLOTRIMAZOLE AND BETAMETHASONE DIPROPIONATE SCH APPLIC: 10; .5 CREAM TOPICAL at 10:15

## 2023-01-31 RX ADMIN — Medication SCH ML: at 09:00

## 2023-01-31 RX ADMIN — SODIUM HYPOCHLORITE SCH ML: 1.25 SOLUTION TOPICAL at 10:14

## 2023-01-31 RX ADMIN — Medication SCH EACH: at 11:52

## 2023-01-31 RX ADMIN — ANORECTAL OINTMENT SCH APPLIC: 15.7; .44; 24; 20.6 OINTMENT TOPICAL at 20:49

## 2023-01-31 RX ADMIN — INSULIN GLARGINE SCH UNITS: 100 INJECTION, SOLUTION SUBCUTANEOUS at 09:55

## 2023-01-31 RX ADMIN — Medication SCH EACH: at 16:25

## 2023-01-31 RX ADMIN — Medication SCH EACH: at 06:26

## 2023-01-31 RX ADMIN — ANORECTAL OINTMENT SCH APPLIC: 15.7; .44; 24; 20.6 OINTMENT TOPICAL at 09:47

## 2023-01-31 RX ADMIN — EMPAGLIFLOZIN SCH MG: 25 TABLET, FILM COATED ORAL at 09:49

## 2023-01-31 RX ADMIN — SODIUM CHLORIDE PRN UNIT: 9 INJECTION, SOLUTION INTRAVENOUS at 07:54

## 2023-01-31 RX ADMIN — HYDROCHLOROTHIAZIDE SCH MG: 25 TABLET ORAL at 09:50

## 2023-01-31 RX ADMIN — SODIUM CHLORIDE PRN MLS/HR: 0.9 INJECTION, SOLUTION INTRAVENOUS at 20:44

## 2023-01-31 RX ADMIN — INSULIN GLARGINE SCH UNITS: 100 INJECTION, SOLUTION SUBCUTANEOUS at 20:54

## 2023-01-31 RX ADMIN — Medication SCH MG: at 09:48

## 2023-01-31 RX ADMIN — PANTOPRAZOLE SODIUM SCH MG: 40 TABLET, DELAYED RELEASE ORAL at 06:21

## 2023-01-31 RX ADMIN — CLOZAPINE SCH MG: 100 TABLET ORAL at 20:49

## 2023-01-31 RX ADMIN — PIPERACILLIN SODIUM AND TAZOBACTAM SODIUM SCH MLS/HR: .375; 3 INJECTION, POWDER, LYOPHILIZED, FOR SOLUTION INTRAVENOUS at 05:37

## 2023-01-31 RX ADMIN — CLOZAPINE SCH MG: 100 TABLET ORAL at 09:51

## 2023-01-31 RX ADMIN — SODIUM CHLORIDE PRN UNIT: 9 INJECTION, SOLUTION INTRAVENOUS at 12:10

## 2023-01-31 RX ADMIN — CLOTRIMAZOLE AND BETAMETHASONE DIPROPIONATE SCH APPLIC: 10; .5 CREAM TOPICAL at 16:49

## 2023-01-31 RX ADMIN — FOLIC ACID SCH MG: 1 TABLET ORAL at 09:49

## 2023-01-31 RX ADMIN — SODIUM CHLORIDE PRN UNIT: 9 INJECTION, SOLUTION INTRAVENOUS at 16:51

## 2023-01-31 RX ADMIN — FERROUS SULFATE TAB 325 MG (65 MG ELEMENTAL FE) SCH MG: 325 (65 FE) TAB at 09:47

## 2023-01-31 RX ADMIN — Medication SCH ML: at 16:51

## 2023-01-31 NOTE — NUR
SCHULER CATHETER REMOVED PER MD ORDER. 800 mls clear urine with small white particles in 
schuler. Will monitor for voiding.

## 2023-01-31 NOTE — NUR
Endorsed pt to tomasz Milligan nurse. Worked this shift with Tami FLEMING [facility] RN student. 
Pt stable, no s/s of distress noted.

## 2023-01-31 NOTE — NUR
Per Hooks, PT, pt was c/o feeling light headed while on the commode. Sitting bp was 89/58, HR 
89, satting 99% on RA. Once back in bed at 0950 pt's BP was 104/63, HR 84. Pt A+Ox4 now, no 
longer c/o feeling light headed or dizzy. Will continue to monitor.

-------------------------------------------------------------------------------

Addendum: 01/31/23 at 1224 by JUDY BUTLER RN

-------------------------------------------------------------------------------

Notified Dr. Mitchell at 1128, MD aware. Will follow orders placed.

## 2023-02-01 VITALS — SYSTOLIC BLOOD PRESSURE: 122 MMHG | DIASTOLIC BLOOD PRESSURE: 76 MMHG

## 2023-02-01 VITALS — DIASTOLIC BLOOD PRESSURE: 98 MMHG | SYSTOLIC BLOOD PRESSURE: 131 MMHG

## 2023-02-01 VITALS — DIASTOLIC BLOOD PRESSURE: 79 MMHG | SYSTOLIC BLOOD PRESSURE: 139 MMHG

## 2023-02-01 VITALS — SYSTOLIC BLOOD PRESSURE: 110 MMHG | DIASTOLIC BLOOD PRESSURE: 76 MMHG

## 2023-02-01 RX ADMIN — INSULIN GLARGINE SCH UNITS: 100 INJECTION, SOLUTION SUBCUTANEOUS at 22:53

## 2023-02-01 RX ADMIN — Medication SCH EACH: at 16:27

## 2023-02-01 RX ADMIN — CLOTRIMAZOLE AND BETAMETHASONE DIPROPIONATE SCH APPLIC: 10; .5 CREAM TOPICAL at 16:28

## 2023-02-01 RX ADMIN — INSULIN GLARGINE SCH UNITS: 100 INJECTION, SOLUTION SUBCUTANEOUS at 10:09

## 2023-02-01 RX ADMIN — ANORECTAL OINTMENT SCH APPLIC: 15.7; .44; 24; 20.6 OINTMENT TOPICAL at 22:52

## 2023-02-01 RX ADMIN — CLOTRIMAZOLE AND BETAMETHASONE DIPROPIONATE SCH APPLIC: 10; .5 CREAM TOPICAL at 09:08

## 2023-02-01 RX ADMIN — Medication SCH EACH: at 05:57

## 2023-02-01 RX ADMIN — PANTOPRAZOLE SODIUM SCH MG: 40 TABLET, DELAYED RELEASE ORAL at 16:19

## 2023-02-01 RX ADMIN — FOLIC ACID SCH MG: 1 TABLET ORAL at 09:04

## 2023-02-01 RX ADMIN — Medication SCH EACH: at 11:48

## 2023-02-01 RX ADMIN — SODIUM CHLORIDE PRN UNIT: 9 INJECTION, SOLUTION INTRAVENOUS at 11:51

## 2023-02-01 RX ADMIN — Medication SCH EACH: at 22:52

## 2023-02-01 RX ADMIN — PANTOPRAZOLE SODIUM SCH MG: 40 TABLET, DELAYED RELEASE ORAL at 05:57

## 2023-02-01 RX ADMIN — SODIUM CHLORIDE PRN UNIT: 9 INJECTION, SOLUTION INTRAVENOUS at 09:03

## 2023-02-01 RX ADMIN — Medication SCH ML: at 09:00

## 2023-02-01 RX ADMIN — CLOZAPINE SCH MG: 100 TABLET ORAL at 09:05

## 2023-02-01 RX ADMIN — EMPAGLIFLOZIN SCH MG: 25 TABLET, FILM COATED ORAL at 09:09

## 2023-02-01 RX ADMIN — FLUCONAZOLE SCH MG: 100 TABLET ORAL at 09:04

## 2023-02-01 RX ADMIN — CLOZAPINE SCH MG: 100 TABLET ORAL at 22:51

## 2023-02-01 RX ADMIN — Medication SCH ML: at 17:00

## 2023-02-01 RX ADMIN — SODIUM CHLORIDE PRN UNIT: 9 INJECTION, SOLUTION INTRAVENOUS at 22:54

## 2023-02-01 RX ADMIN — ANORECTAL OINTMENT SCH APPLIC: 15.7; .44; 24; 20.6 OINTMENT TOPICAL at 09:12

## 2023-02-01 RX ADMIN — SODIUM CHLORIDE PRN MLS/HR: 0.9 INJECTION, SOLUTION INTRAVENOUS at 12:18

## 2023-02-01 RX ADMIN — SODIUM HYPOCHLORITE SCH ML: 1.25 SOLUTION TOPICAL at 09:07

## 2023-02-01 RX ADMIN — SODIUM CHLORIDE PRN UNIT: 9 INJECTION, SOLUTION INTRAVENOUS at 16:29

## 2023-02-01 RX ADMIN — FERROUS SULFATE TAB 325 MG (65 MG ELEMENTAL FE) SCH MG: 325 (65 FE) TAB at 09:04

## 2023-02-01 NOTE — NUR
Spoke with Dr. Iraj Mann re: removal of drain tube prior to discharge per ARU meeting 
today at 1pm. Dr. Mann says that he will need to see the wound first and he hopes to be 
able to do that prior to pt's discharge which he knows is planned for 2/6/23.

## 2023-02-02 VITALS — DIASTOLIC BLOOD PRESSURE: 81 MMHG | SYSTOLIC BLOOD PRESSURE: 128 MMHG

## 2023-02-02 VITALS — SYSTOLIC BLOOD PRESSURE: 145 MMHG | DIASTOLIC BLOOD PRESSURE: 99 MMHG

## 2023-02-02 VITALS — SYSTOLIC BLOOD PRESSURE: 143 MMHG | DIASTOLIC BLOOD PRESSURE: 88 MMHG

## 2023-02-02 VITALS — DIASTOLIC BLOOD PRESSURE: 72 MMHG | SYSTOLIC BLOOD PRESSURE: 130 MMHG

## 2023-02-02 RX ADMIN — Medication SCH EACH: at 11:25

## 2023-02-02 RX ADMIN — LORAZEPAM PRN MG: 1 TABLET ORAL at 13:12

## 2023-02-02 RX ADMIN — Medication SCH ML: at 17:06

## 2023-02-02 RX ADMIN — EMPAGLIFLOZIN SCH MG: 25 TABLET, FILM COATED ORAL at 09:01

## 2023-02-02 RX ADMIN — PANTOPRAZOLE SODIUM SCH MG: 40 TABLET, DELAYED RELEASE ORAL at 17:06

## 2023-02-02 RX ADMIN — SODIUM CHLORIDE PRN UNIT: 9 INJECTION, SOLUTION INTRAVENOUS at 17:10

## 2023-02-02 RX ADMIN — PANTOPRAZOLE SODIUM SCH MG: 40 TABLET, DELAYED RELEASE ORAL at 06:11

## 2023-02-02 RX ADMIN — Medication SCH ML: at 09:02

## 2023-02-02 RX ADMIN — ANORECTAL OINTMENT SCH APPLIC: 15.7; .44; 24; 20.6 OINTMENT TOPICAL at 09:02

## 2023-02-02 RX ADMIN — FERROUS SULFATE TAB 325 MG (65 MG ELEMENTAL FE) SCH MG: 325 (65 FE) TAB at 08:58

## 2023-02-02 RX ADMIN — CLOTRIMAZOLE AND BETAMETHASONE DIPROPIONATE SCH APPLIC: 10; .5 CREAM TOPICAL at 09:04

## 2023-02-02 RX ADMIN — Medication SCH EACH: at 06:17

## 2023-02-02 RX ADMIN — SODIUM CHLORIDE PRN MLS/HR: 0.9 INJECTION, SOLUTION INTRAVENOUS at 02:36

## 2023-02-02 RX ADMIN — CLOTRIMAZOLE AND BETAMETHASONE DIPROPIONATE SCH APPLIC: 10; .5 CREAM TOPICAL at 17:15

## 2023-02-02 RX ADMIN — INSULIN GLARGINE SCH UNITS: 100 INJECTION, SOLUTION SUBCUTANEOUS at 20:35

## 2023-02-02 RX ADMIN — SODIUM CHLORIDE PRN UNIT: 9 INJECTION, SOLUTION INTRAVENOUS at 11:45

## 2023-02-02 RX ADMIN — Medication SCH EACH: at 16:20

## 2023-02-02 RX ADMIN — ANORECTAL OINTMENT SCH APPLIC: 15.7; .44; 24; 20.6 OINTMENT TOPICAL at 20:35

## 2023-02-02 RX ADMIN — Medication SCH EACH: at 20:36

## 2023-02-02 RX ADMIN — CLOZAPINE SCH MG: 100 TABLET ORAL at 20:39

## 2023-02-02 RX ADMIN — FLUCONAZOLE SCH MG: 100 TABLET ORAL at 08:57

## 2023-02-02 RX ADMIN — FOLIC ACID SCH MG: 1 TABLET ORAL at 08:57

## 2023-02-02 RX ADMIN — SODIUM CHLORIDE PRN MLS/HR: 0.9 INJECTION, SOLUTION INTRAVENOUS at 22:41

## 2023-02-02 RX ADMIN — CLOZAPINE SCH MG: 100 TABLET ORAL at 09:01

## 2023-02-02 RX ADMIN — SODIUM CHLORIDE PRN UNIT: 9 INJECTION, SOLUTION INTRAVENOUS at 20:38

## 2023-02-02 RX ADMIN — INSULIN GLARGINE SCH UNITS: 100 INJECTION, SOLUTION SUBCUTANEOUS at 09:03

## 2023-02-02 RX ADMIN — SODIUM HYPOCHLORITE SCH ML: 1.25 SOLUTION TOPICAL at 09:05

## 2023-02-02 NOTE — NUR
0730-Rec'd in bed asleep, able to wake up on verbal commands/tactile stimuli. Patient denies 
pain. No unusual observations. Safety measures in place and call light at reach.

## 2023-02-02 NOTE — NUR
0900-SCHEDULED MEDICATION ADMINISTERED WITH NO ASE NOTED. ORAL FLUIDS TAKEN WELL.



0930-OOB AMBULATING WITH FWW AND REHAB PERSON, PATIENT ACTIVELY PARTICIPATING AND DOING WELL 
IN THERAPY. DRESSING TO MIDLINE EUGENE CHANGED, PATIENT DENIES ANY DISCOMFORT.



1130-BS CHECKED AS ORDERED/SS INSULIN ADMINISTERED AS ORDERED; NO S/S OF HYPO/HYPERGLYCEMIA 
NOTED.



1230-PATIENT EATING LUNCH, NO SWALLOWING PROBLEMS NOTED, SUPERVISION AND ASSIST PROVIDED.

## 2023-02-02 NOTE — NUR
Wound care provided/done to rt inner thigh abscess as ordered by MD. Patient george. well 
procedure with no c/o any pain. No unusual occurrences of findings through out shift. All 
needs anticipated and met. Patient was visited by his parents during shift.

## 2023-02-03 VITALS — SYSTOLIC BLOOD PRESSURE: 116 MMHG | DIASTOLIC BLOOD PRESSURE: 77 MMHG

## 2023-02-03 VITALS — DIASTOLIC BLOOD PRESSURE: 78 MMHG | SYSTOLIC BLOOD PRESSURE: 111 MMHG

## 2023-02-03 VITALS — DIASTOLIC BLOOD PRESSURE: 92 MMHG | SYSTOLIC BLOOD PRESSURE: 153 MMHG

## 2023-02-03 LAB
ALP SERPL-CCNC: 179 U/L (ref 50–136)
ALT SERPL W/O P-5'-P-CCNC: 23 U/L (ref 16–63)
AST SERPL-CCNC: 14 U/L (ref 15–37)
BILIRUB SERPL-MCNC: 0.2 MG/DL (ref 0.2–1)
BUN SERPL-MCNC: 14 MG/DL (ref 7–18)
CHLORIDE SERPL-SCNC: 108 MMOL/L (ref 98–107)
CO2 SERPL-SCNC: 26 MMOL/L (ref 21–32)
CREAT SERPL-MCNC: 1.6 MG/DL (ref 0.6–1.3)
GLUCOSE SERPL-MCNC: 149 MG/DL (ref 74–106)
HCT VFR BLD AUTO: 27.4 % (ref 36.7–47.1)
MAGNESIUM SERPL-MCNC: 2.3 MG/DL (ref 1.8–2.4)
MCH RBC QN AUTO: 29.6 UUG (ref 23.8–33.4)
MCV RBC AUTO: 89.4 FL (ref 73–96.2)
PHOSPHATE SERPL-MCNC: 3.6 MG/DL (ref 2.5–4.9)
PLATELET # BLD AUTO: 410 K/UL (ref 152–348)
POTASSIUM SERPL-SCNC: 3.7 MMOL/L (ref 3.5–5.1)
WS STN SPEC: 7.5 G/DL (ref 6.4–8.2)

## 2023-02-03 RX ADMIN — INSULIN GLARGINE SCH UNITS: 100 INJECTION, SOLUTION SUBCUTANEOUS at 08:47

## 2023-02-03 RX ADMIN — Medication SCH ML: at 08:46

## 2023-02-03 RX ADMIN — Medication SCH EACH: at 21:04

## 2023-02-03 RX ADMIN — Medication SCH EACH: at 11:51

## 2023-02-03 RX ADMIN — CLOZAPINE SCH MG: 100 TABLET ORAL at 08:48

## 2023-02-03 RX ADMIN — FOLIC ACID SCH MG: 1 TABLET ORAL at 08:46

## 2023-02-03 RX ADMIN — ANORECTAL OINTMENT SCH APPLIC: 15.7; .44; 24; 20.6 OINTMENT TOPICAL at 21:04

## 2023-02-03 RX ADMIN — SODIUM CHLORIDE PRN UNIT: 9 INJECTION, SOLUTION INTRAVENOUS at 16:48

## 2023-02-03 RX ADMIN — EMPAGLIFLOZIN SCH MG: 25 TABLET, FILM COATED ORAL at 08:46

## 2023-02-03 RX ADMIN — ANORECTAL OINTMENT SCH APPLIC: 15.7; .44; 24; 20.6 OINTMENT TOPICAL at 08:47

## 2023-02-03 RX ADMIN — Medication SCH EACH: at 06:44

## 2023-02-03 RX ADMIN — CLOTRIMAZOLE AND BETAMETHASONE DIPROPIONATE SCH APPLIC: 10; .5 CREAM TOPICAL at 16:53

## 2023-02-03 RX ADMIN — FERROUS SULFATE TAB 325 MG (65 MG ELEMENTAL FE) SCH MG: 325 (65 FE) TAB at 08:45

## 2023-02-03 RX ADMIN — SODIUM HYPOCHLORITE SCH ML: 1.25 SOLUTION TOPICAL at 08:48

## 2023-02-03 RX ADMIN — INSULIN GLARGINE SCH UNITS: 100 INJECTION, SOLUTION SUBCUTANEOUS at 21:05

## 2023-02-03 RX ADMIN — PANTOPRAZOLE SODIUM SCH MG: 40 TABLET, DELAYED RELEASE ORAL at 06:35

## 2023-02-03 RX ADMIN — CLOTRIMAZOLE AND BETAMETHASONE DIPROPIONATE SCH APPLIC: 10; .5 CREAM TOPICAL at 08:48

## 2023-02-03 RX ADMIN — FLUCONAZOLE SCH MG: 100 TABLET ORAL at 08:46

## 2023-02-03 RX ADMIN — Medication SCH ML: at 16:52

## 2023-02-03 RX ADMIN — PANTOPRAZOLE SODIUM SCH MG: 40 TABLET, DELAYED RELEASE ORAL at 16:52

## 2023-02-03 RX ADMIN — CLOZAPINE SCH MG: 100 TABLET ORAL at 21:03

## 2023-02-03 RX ADMIN — SODIUM CHLORIDE PRN UNIT: 9 INJECTION, SOLUTION INTRAVENOUS at 21:08

## 2023-02-03 RX ADMIN — Medication SCH EACH: at 16:44

## 2023-02-03 NOTE — NUR
Patient slept well, in no acute distress. No c/o pain/discomfort. Wound dressing intact. EUGENE 
midline intact. Needs assessed and attended to.

## 2023-02-03 NOTE — NUR
1905- The patient is aox4. Vital signs stable. The patient has jason midline that is clean, 
dry, patent, and intact. The patients mother is in the room. The patient has no complains 
of pain or sob. Call light within reach, two side rails up, bed at lowest position, and bed 
alarm on, wheels lock. Will continue to monitor throughout the shift. 

2100- The patient has no sob or complains of pain. The patient request for extra water and 
an extra blanket. Items are given to the patient. Will continue to monitor throughout the 
shift.

## 2023-02-04 VITALS — SYSTOLIC BLOOD PRESSURE: 120 MMHG | DIASTOLIC BLOOD PRESSURE: 79 MMHG

## 2023-02-04 VITALS — DIASTOLIC BLOOD PRESSURE: 80 MMHG | SYSTOLIC BLOOD PRESSURE: 139 MMHG

## 2023-02-04 VITALS — DIASTOLIC BLOOD PRESSURE: 77 MMHG | SYSTOLIC BLOOD PRESSURE: 122 MMHG

## 2023-02-04 VITALS — SYSTOLIC BLOOD PRESSURE: 127 MMHG | DIASTOLIC BLOOD PRESSURE: 82 MMHG

## 2023-02-04 RX ADMIN — ANORECTAL OINTMENT SCH APPLIC: 15.7; .44; 24; 20.6 OINTMENT TOPICAL at 08:32

## 2023-02-04 RX ADMIN — Medication SCH ML: at 16:05

## 2023-02-04 RX ADMIN — SODIUM CHLORIDE PRN UNIT: 9 INJECTION, SOLUTION INTRAVENOUS at 08:29

## 2023-02-04 RX ADMIN — CLOZAPINE SCH MG: 100 TABLET ORAL at 20:54

## 2023-02-04 RX ADMIN — FERROUS SULFATE TAB 325 MG (65 MG ELEMENTAL FE) SCH MG: 325 (65 FE) TAB at 08:31

## 2023-02-04 RX ADMIN — INSULIN GLARGINE SCH UNITS: 100 INJECTION, SOLUTION SUBCUTANEOUS at 20:59

## 2023-02-04 RX ADMIN — CLOTRIMAZOLE AND BETAMETHASONE DIPROPIONATE SCH APPLIC: 10; .5 CREAM TOPICAL at 16:05

## 2023-02-04 RX ADMIN — Medication SCH EACH: at 11:32

## 2023-02-04 RX ADMIN — SODIUM HYPOCHLORITE SCH ML: 1.25 SOLUTION TOPICAL at 08:33

## 2023-02-04 RX ADMIN — EMPAGLIFLOZIN SCH MG: 25 TABLET, FILM COATED ORAL at 08:31

## 2023-02-04 RX ADMIN — CLOTRIMAZOLE AND BETAMETHASONE DIPROPIONATE SCH APPLIC: 10; .5 CREAM TOPICAL at 08:33

## 2023-02-04 RX ADMIN — ANORECTAL OINTMENT SCH APPLIC: 15.7; .44; 24; 20.6 OINTMENT TOPICAL at 20:59

## 2023-02-04 RX ADMIN — FLUCONAZOLE SCH MG: 100 TABLET ORAL at 08:31

## 2023-02-04 RX ADMIN — Medication SCH ML: at 08:32

## 2023-02-04 RX ADMIN — CLOZAPINE SCH MG: 100 TABLET ORAL at 08:30

## 2023-02-04 RX ADMIN — SODIUM CHLORIDE PRN UNIT: 9 INJECTION, SOLUTION INTRAVENOUS at 15:39

## 2023-02-04 RX ADMIN — FOLIC ACID SCH MG: 1 TABLET ORAL at 08:31

## 2023-02-04 RX ADMIN — PANTOPRAZOLE SODIUM SCH MG: 40 TABLET, DELAYED RELEASE ORAL at 16:05

## 2023-02-04 RX ADMIN — SODIUM CHLORIDE PRN UNIT: 9 INJECTION, SOLUTION INTRAVENOUS at 20:58

## 2023-02-04 RX ADMIN — Medication SCH EACH: at 15:39

## 2023-02-04 RX ADMIN — Medication SCH EACH: at 20:56

## 2023-02-04 RX ADMIN — INSULIN GLARGINE SCH UNITS: 100 INJECTION, SOLUTION SUBCUTANEOUS at 08:30

## 2023-02-04 RX ADMIN — SODIUM CHLORIDE PRN UNIT: 9 INJECTION, SOLUTION INTRAVENOUS at 11:33

## 2023-02-04 RX ADMIN — Medication SCH EACH: at 07:48

## 2023-02-04 RX ADMIN — PANTOPRAZOLE SODIUM SCH MG: 40 TABLET, DELAYED RELEASE ORAL at 06:40

## 2023-02-04 NOTE — NUR
1905- The patient is aox4. Vital signs stable. The patient has jason midline that is clean, 
dry, patent, and intact. The patients parents are in the room. The patient has no complains 
of pain or sob. Call light within reach, two side rails up, bed at lowest position, and bed 
alarm on, wheels lock. Will continue to monitor throughout the shift. 

2200- The patient has no sob or complains of pain. The patient request for extra blankets. 
Items are given to the patient. Will continue to monitor throughout the shift.

## 2023-02-05 VITALS — DIASTOLIC BLOOD PRESSURE: 91 MMHG | SYSTOLIC BLOOD PRESSURE: 141 MMHG

## 2023-02-05 VITALS — SYSTOLIC BLOOD PRESSURE: 118 MMHG | DIASTOLIC BLOOD PRESSURE: 79 MMHG

## 2023-02-05 VITALS — DIASTOLIC BLOOD PRESSURE: 81 MMHG | SYSTOLIC BLOOD PRESSURE: 127 MMHG

## 2023-02-05 VITALS — SYSTOLIC BLOOD PRESSURE: 121 MMHG | DIASTOLIC BLOOD PRESSURE: 71 MMHG

## 2023-02-05 RX ADMIN — SODIUM CHLORIDE PRN UNIT: 9 INJECTION, SOLUTION INTRAVENOUS at 16:34

## 2023-02-05 RX ADMIN — Medication SCH ML: at 16:21

## 2023-02-05 RX ADMIN — ANORECTAL OINTMENT SCH APPLIC: 15.7; .44; 24; 20.6 OINTMENT TOPICAL at 21:00

## 2023-02-05 RX ADMIN — PANTOPRAZOLE SODIUM SCH MG: 40 TABLET, DELAYED RELEASE ORAL at 16:23

## 2023-02-05 RX ADMIN — EMPAGLIFLOZIN SCH MG: 25 TABLET, FILM COATED ORAL at 08:25

## 2023-02-05 RX ADMIN — INSULIN GLARGINE SCH UNITS: 100 INJECTION, SOLUTION SUBCUTANEOUS at 08:24

## 2023-02-05 RX ADMIN — SODIUM CHLORIDE PRN UNIT: 9 INJECTION, SOLUTION INTRAVENOUS at 08:23

## 2023-02-05 RX ADMIN — CLOZAPINE SCH MG: 100 TABLET ORAL at 22:16

## 2023-02-05 RX ADMIN — Medication SCH ML: at 08:26

## 2023-02-05 RX ADMIN — Medication SCH EACH: at 07:21

## 2023-02-05 RX ADMIN — FLUCONAZOLE SCH MG: 100 TABLET ORAL at 08:25

## 2023-02-05 RX ADMIN — Medication SCH EACH: at 11:19

## 2023-02-05 RX ADMIN — SODIUM CHLORIDE PRN UNIT: 9 INJECTION, SOLUTION INTRAVENOUS at 11:20

## 2023-02-05 RX ADMIN — PANTOPRAZOLE SODIUM SCH MG: 40 TABLET, DELAYED RELEASE ORAL at 06:37

## 2023-02-05 RX ADMIN — INSULIN GLARGINE SCH UNITS: 100 INJECTION, SOLUTION SUBCUTANEOUS at 19:30

## 2023-02-05 RX ADMIN — FOLIC ACID SCH MG: 1 TABLET ORAL at 08:25

## 2023-02-05 RX ADMIN — CLOTRIMAZOLE AND BETAMETHASONE DIPROPIONATE SCH APPLIC: 10; .5 CREAM TOPICAL at 16:23

## 2023-02-05 RX ADMIN — Medication SCH EACH: at 21:00

## 2023-02-05 RX ADMIN — FERROUS SULFATE TAB 325 MG (65 MG ELEMENTAL FE) SCH MG: 325 (65 FE) TAB at 08:24

## 2023-02-05 RX ADMIN — CLOZAPINE SCH MG: 100 TABLET ORAL at 08:25

## 2023-02-05 RX ADMIN — CLOTRIMAZOLE AND BETAMETHASONE DIPROPIONATE SCH APPLIC: 10; .5 CREAM TOPICAL at 08:27

## 2023-02-05 RX ADMIN — SODIUM HYPOCHLORITE SCH ML: 1.25 SOLUTION TOPICAL at 08:26

## 2023-02-05 RX ADMIN — Medication SCH EACH: at 16:21

## 2023-02-05 RX ADMIN — ANORECTAL OINTMENT SCH APPLIC: 15.7; .44; 24; 20.6 OINTMENT TOPICAL at 08:26

## 2023-02-05 NOTE — NUR
no acute distress noted, dressing changed to right groin area, wound bed is red in color, 
still noted with mild to moderate serosanguineous drainage, drainage tubes are intact and 
noted with  serosanguineous moderate drainage.

## 2023-02-06 VITALS — SYSTOLIC BLOOD PRESSURE: 92 MMHG | DIASTOLIC BLOOD PRESSURE: 60 MMHG

## 2023-02-06 VITALS — SYSTOLIC BLOOD PRESSURE: 132 MMHG | DIASTOLIC BLOOD PRESSURE: 93 MMHG

## 2023-02-06 LAB
ALP SERPL-CCNC: 200 U/L (ref 50–136)
ALT SERPL W/O P-5'-P-CCNC: 27 U/L (ref 16–63)
AST SERPL-CCNC: 21 U/L (ref 15–37)
BILIRUB SERPL-MCNC: 0.2 MG/DL (ref 0.2–1)
BUN SERPL-MCNC: 18 MG/DL (ref 7–18)
CHLORIDE SERPL-SCNC: 106 MMOL/L (ref 98–107)
CO2 SERPL-SCNC: 24 MMOL/L (ref 21–32)
CREAT SERPL-MCNC: 2.1 MG/DL (ref 0.6–1.3)
GLUCOSE SERPL-MCNC: 252 MG/DL (ref 74–106)
HCT VFR BLD AUTO: 30.8 % (ref 36.7–47.1)
MAGNESIUM SERPL-MCNC: 2.6 MG/DL (ref 1.8–2.4)
MCH RBC QN AUTO: 29 UUG (ref 23.8–33.4)
MCV RBC AUTO: 89.4 FL (ref 73–96.2)
PHOSPHATE SERPL-MCNC: 4.1 MG/DL (ref 2.5–4.9)
PLATELET # BLD AUTO: 446 K/UL (ref 152–348)
POTASSIUM SERPL-SCNC: 4.5 MMOL/L (ref 3.5–5.1)
WS STN SPEC: 8 G/DL (ref 6.4–8.2)

## 2023-02-06 RX ADMIN — CLOTRIMAZOLE AND BETAMETHASONE DIPROPIONATE SCH APPLIC: 10; .5 CREAM TOPICAL at 10:06

## 2023-02-06 RX ADMIN — SODIUM CHLORIDE PRN UNIT: 9 INJECTION, SOLUTION INTRAVENOUS at 10:05

## 2023-02-06 RX ADMIN — EMPAGLIFLOZIN SCH MG: 25 TABLET, FILM COATED ORAL at 10:02

## 2023-02-06 RX ADMIN — FERROUS SULFATE TAB 325 MG (65 MG ELEMENTAL FE) SCH MG: 325 (65 FE) TAB at 10:02

## 2023-02-06 RX ADMIN — ANORECTAL OINTMENT SCH APPLIC: 15.7; .44; 24; 20.6 OINTMENT TOPICAL at 10:03

## 2023-02-06 RX ADMIN — Medication SCH EACH: at 12:03

## 2023-02-06 RX ADMIN — SODIUM CHLORIDE PRN UNIT: 9 INJECTION, SOLUTION INTRAVENOUS at 12:05

## 2023-02-06 RX ADMIN — PANTOPRAZOLE SODIUM SCH MG: 40 TABLET, DELAYED RELEASE ORAL at 06:19

## 2023-02-06 RX ADMIN — INSULIN GLARGINE SCH UNITS: 100 INJECTION, SOLUTION SUBCUTANEOUS at 09:08

## 2023-02-06 RX ADMIN — FOLIC ACID SCH MG: 1 TABLET ORAL at 10:02

## 2023-02-06 RX ADMIN — Medication SCH ML: at 10:09

## 2023-02-06 RX ADMIN — CLOZAPINE SCH MG: 100 TABLET ORAL at 10:02

## 2023-02-06 RX ADMIN — Medication SCH EACH: at 07:33

## 2023-02-06 RX ADMIN — SODIUM HYPOCHLORITE SCH ML: 1.25 SOLUTION TOPICAL at 10:06

## 2023-02-06 NOTE — NUR
0730-REC'D PATIENT IN BED, AWAKE, A/OX4, ABLE TO VERBALIZE HIS NEEDS AND FOLLOW DIRECTIONS. 
PATIENT DENIES PAIN. NO ACUTE RESP. DISTRESS NOTED,  ORAL FLUIDS TAKEN WELL. NO S/S OF 
HYPO/HYPERGLYCEMIA OR HYPO/HTN  NOTED.  CALL LIGHT WITHIN REACH, SAFETY MEASURES IN PLACE.



1200-FS FOR BS CHECKED WITH INS. S/S COVERAGE PROVIDED AS ORDERED. PATIENT IN STABLE 
CONDITIONS. EATING AND DRINKING WELL, DENIES N/V/GI DISCOMFORT. PENDING DC HOME TODAY.



1540-PATIENT WAS DISCHARGED SAFELY HOME. PAPERWORK NEEDED/PHYSICIAN ORDERS/INVENTORY LIST 
SIGNED/BELONGINGS TAKEN. ASSISTED PATIENT DOWN FLOOR BY THE EXIT. FREE OF PAIN, NO 
RESPIRATORY DISTRESS NOTED. VSS AND LOGGED IN [employer] SYSTEM AS PER PROTOCOL.

## 2023-02-06 NOTE — NUR
RECEIVED PT ALERT AND ORIENTED X4 REFUSED HS MEDICATION AN ACCUCHEK BUT TOOK AM MEDICATION 
AND BLOOD SUGAR  NO SIGNS OF DIABETIC REACTION NOTED.  WILL CONTINUE TO MONITOR FOR 
SAFETY.

-------------------------------------------------------------------------------

Addendum: 02/06/23 at 0757 by REGISTRY [facility] INPATIENT RN23 RN

-------------------------------------------------------------------------------

PT IS SCHEDULED FOR  DISCHARGED THIS AM.  WILL ENDORSE TO AM NURSE.

## 2023-02-09 ENCOUNTER — HOSPITAL ENCOUNTER (OUTPATIENT)
Dept: HOSPITAL 54 - WOU | Age: 43
Discharge: HOME HEALTH | End: 2023-02-09
Attending: SURGERY
Payer: MEDICARE

## 2023-02-09 DIAGNOSIS — Z79.84: ICD-10-CM

## 2023-02-09 DIAGNOSIS — T81.31XA: Primary | ICD-10-CM

## 2023-02-09 DIAGNOSIS — E66.01: ICD-10-CM

## 2023-02-09 DIAGNOSIS — Z79.4: ICD-10-CM

## 2023-02-09 DIAGNOSIS — I10: ICD-10-CM

## 2023-02-09 DIAGNOSIS — E11.9: ICD-10-CM

## 2023-02-09 PROCEDURE — A6407 PACKING STRIPS, NON-IMPREG: HCPCS

## 2023-02-16 ENCOUNTER — HOSPITAL ENCOUNTER (OUTPATIENT)
Dept: HOSPITAL 54 - WOU | Age: 43
Discharge: HOME HEALTH | End: 2023-02-16
Attending: SURGERY
Payer: MEDICARE

## 2023-02-16 DIAGNOSIS — E11.9: ICD-10-CM

## 2023-02-16 DIAGNOSIS — Z79.84: ICD-10-CM

## 2023-02-16 DIAGNOSIS — Z79.4: ICD-10-CM

## 2023-02-16 DIAGNOSIS — T81.31XA: Primary | ICD-10-CM

## 2023-02-16 DIAGNOSIS — E66.01: ICD-10-CM

## 2023-02-16 DIAGNOSIS — I10: ICD-10-CM

## 2023-03-09 ENCOUNTER — HOSPITAL ENCOUNTER (OUTPATIENT)
Dept: HOSPITAL 54 - WOU | Age: 43
Discharge: HOME HEALTH | End: 2023-03-09
Attending: SURGERY
Payer: MEDICARE

## 2023-03-09 DIAGNOSIS — Z79.4: ICD-10-CM

## 2023-03-09 DIAGNOSIS — I10: ICD-10-CM

## 2023-03-09 DIAGNOSIS — T81.31XA: Primary | ICD-10-CM

## 2023-03-09 DIAGNOSIS — Z79.84: ICD-10-CM

## 2023-03-09 DIAGNOSIS — E11.9: ICD-10-CM

## 2023-03-09 DIAGNOSIS — E66.01: ICD-10-CM

## 2023-03-30 ENCOUNTER — HOSPITAL ENCOUNTER (OUTPATIENT)
Dept: HOSPITAL 54 - WOU | Age: 43
Discharge: HOME HEALTH | End: 2023-03-30
Attending: PODIATRIST
Payer: MEDICARE

## 2023-03-30 DIAGNOSIS — Z79.84: ICD-10-CM

## 2023-03-30 DIAGNOSIS — E66.01: ICD-10-CM

## 2023-03-30 DIAGNOSIS — I10: ICD-10-CM

## 2023-03-30 DIAGNOSIS — Z79.4: ICD-10-CM

## 2023-03-30 DIAGNOSIS — T81.31XA: Primary | ICD-10-CM

## 2023-03-30 DIAGNOSIS — E11.9: ICD-10-CM

## 2023-05-04 ENCOUNTER — HOSPITAL ENCOUNTER (OUTPATIENT)
Dept: HOSPITAL 54 - WOU | Age: 43
Discharge: HOME | End: 2023-05-04
Attending: SURGERY
Payer: MEDICARE

## 2023-05-04 DIAGNOSIS — I10: ICD-10-CM

## 2023-05-04 DIAGNOSIS — E11.9: ICD-10-CM

## 2023-05-04 DIAGNOSIS — T81.89XD: Primary | ICD-10-CM

## 2023-05-04 DIAGNOSIS — E66.01: ICD-10-CM

## 2023-05-04 PROCEDURE — G0463 HOSPITAL OUTPT CLINIC VISIT: HCPCS

## 2024-01-21 ENCOUNTER — HOSPITAL ENCOUNTER (EMERGENCY)
Dept: HOSPITAL 54 - ER | Age: 44
Discharge: HOME | End: 2024-01-21
Payer: MEDICARE

## 2024-01-21 VITALS — HEIGHT: 69 IN | BODY MASS INDEX: 37.03 KG/M2 | WEIGHT: 250 LBS

## 2024-01-21 VITALS — DIASTOLIC BLOOD PRESSURE: 102 MMHG | TEMPERATURE: 98.4 F | SYSTOLIC BLOOD PRESSURE: 140 MMHG

## 2024-01-21 VITALS — OXYGEN SATURATION: 99 %

## 2024-01-21 DIAGNOSIS — F20.9: ICD-10-CM

## 2024-01-21 DIAGNOSIS — E11.9: ICD-10-CM

## 2024-01-21 DIAGNOSIS — I10: ICD-10-CM

## 2024-01-21 DIAGNOSIS — R05.9: Primary | ICD-10-CM

## 2024-01-21 DIAGNOSIS — Z79.899: ICD-10-CM

## 2024-01-21 DIAGNOSIS — Z79.4: ICD-10-CM
